# Patient Record
(demographics unavailable — no encounter records)

---

## 2017-07-14 NOTE — MAMMOGRAPHY REPORT
BILATERAL DIGITAL SCREENING MAMMOGRAM with CAD: 07/14/17 09:04:00



CLINICAL: Routine screening.



COMPARISON:05/27/16



FINDINGS: The breasts are heterogeneously dense, which may obscures 

small masses.  A left focal asymmetry requires additional imaging.No 

architectural distortion or suspicious calcifications.The right breast 

is negative.



IMPRESSION: Left focal asymmetry requiring further workup.



BI-RADS CATEGORY:  0 -- Additional Imaging Evaluation Required



RECOMMENDATION: Recall for left mediolateral , spot compression  CC and 

MLO views and ultrasound if needed.



ACR BI-RADS MAMMOGRAPHIC CODES:

0 = Needs additional imaging evaluation; 1 = Negative; 2 = Benign; 3 = 

Probably benign; 4 = Suspicious; 5 = Malignant; 6 = Known biopsy-proven 

malignancy



COMMENT:

      1.   Dense breast tissue, i.e., adenosis, fibrocystic 

            changes, etc., may obscure an underlying neoplasm.

      2.   Approximately 10% of cancers are not detected with

            mammography.

      3.   A negative mammography report should not delay biopsy 

            if a clinically suspicious mass is present.



COMMENT:

Patient follow-up letters are generated via our [employer] application.

## 2017-10-05 NOTE — MAMMOGRAPHY REPORT
LEFT DIGITAL DIAGNOSTIC MAMMOGRAM : 10/05/17 08:43:00



CLINICAL: Recalled for asymmetry.



COMPARISON:07/14/17 screening



FINDINGS: ML, rolled CC and spot compression MLO and exaggerated CC 

views were performed and demonstrate no persistent asymmetry.





IMPRESSION: No mammographic evidence of malignancy.



BI-RADS CATEGORY:  1 -- Negative



RECOMMENDATION: Return to routine mammographic screening.



ACR BI-RADS MAMMOGRAPHIC CODES:

0 = Needs additional imaging evaluation; 1 = Negative; 2 = Benign; 3 = 

Probably benign; 4 = Suspicious; 5 = Malignant; 6 = Known biopsy-proven 

malignancy



COMMENT:

      1.   Dense breast tissue, i.e., adenosis, fibrocystic 

            changes, etc., may obscure an underlying neoplasm.

      2.   Approximately 10% of cancers are not detected with

            mammography.

      3.   A negative mammography report should not delay biopsy 

            if a clinically suspicious mass is present.





COMMENT:

Patient follow-up letters are generated via our [employer] 

application.